# Patient Record
Sex: MALE | Race: BLACK OR AFRICAN AMERICAN | NOT HISPANIC OR LATINO | Employment: FULL TIME | URBAN - METROPOLITAN AREA
[De-identification: names, ages, dates, MRNs, and addresses within clinical notes are randomized per-mention and may not be internally consistent; named-entity substitution may affect disease eponyms.]

---

## 2018-03-05 ENCOUNTER — OFFICE VISIT (OUTPATIENT)
Dept: FAMILY MEDICINE | Facility: CLINIC | Age: 48
End: 2018-03-05
Payer: COMMERCIAL

## 2018-03-05 VITALS
BODY MASS INDEX: 37.75 KG/M2 | SYSTOLIC BLOOD PRESSURE: 122 MMHG | DIASTOLIC BLOOD PRESSURE: 92 MMHG | HEIGHT: 74 IN | RESPIRATION RATE: 16 BRPM | TEMPERATURE: 97 F | HEART RATE: 86 BPM | WEIGHT: 294.13 LBS | OXYGEN SATURATION: 96 %

## 2018-03-05 DIAGNOSIS — L91.8 SKIN TAG: Primary | ICD-10-CM

## 2018-03-05 PROCEDURE — 99999 PR PBB SHADOW E&M-NEW PATIENT-LVL III: CPT | Mod: PBBFAC,,, | Performed by: REGISTERED NURSE

## 2018-03-05 PROCEDURE — 99213 OFFICE O/P EST LOW 20 MIN: CPT | Mod: 25,S$GLB,, | Performed by: REGISTERED NURSE

## 2018-03-05 PROCEDURE — 3074F SYST BP LT 130 MM HG: CPT | Mod: S$GLB,,, | Performed by: REGISTERED NURSE

## 2018-03-05 PROCEDURE — 3080F DIAST BP >= 90 MM HG: CPT | Mod: S$GLB,,, | Performed by: REGISTERED NURSE

## 2018-03-05 PROCEDURE — 11200 RMVL SKIN TAGS UP TO&INC 15: CPT | Mod: S$GLB,,, | Performed by: REGISTERED NURSE

## 2018-03-05 NOTE — PROGRESS NOTES
"Subjective:       Patient ID: Michael Franz is a 47 y.o. male.    Chief Complaint: Skin Tags      HPI    Michael is a new patient to me today, here requesting to have skin tags removed.  This has been a chronic issue; has had removal done in the past.  Skin tag to RT axillary and side area.      Review of Systems  See HPI.      Patient Active Problem List   Diagnosis    Essential hypertension, benign    DJD (degenerative joint disease), lumbar    DJD (degenerative joint disease), cervical         Past Medical History:   Diagnosis Date    Essential hypertension, benign        Past medical, social and family histories have been reviewed today.      Objective:     Vitals:    03/05/18 0931   BP: (!) 122/92   BP Location: Left arm   Patient Position: Sitting   BP Method: Large (Manual)   Pulse: 86   Resp: 16   Temp: 97 °F (36.1 °C)   TempSrc: Tympanic   SpO2: 96%   Weight: 133.4 kg (294 lb 1.5 oz)   Height: 6' 2" (1.88 m)       Physical Exam   Constitutional: He is oriented to person, place, and time. He appears well-developed and well-nourished.   Neurological: He is alert and oriented to person, place, and time.   Skin:   Skin tag x 2 noted to RT upper side/flank area with 1 skin tag noted to RT upper axillary area --- no infection noted; fleshy colored, pedunculated.   Vitals reviewed.        Medication List with Changes/Refills   Discontinued Medications    BACLOFEN (LIORESAL) 10 MG TABLET    Take 1 tablet (10 mg total) by mouth 2 (two) times daily as needed.    HYDROCODONE-ACETAMINOPHEN  (LORTAB)  MG PER TABLET    Take 1 tablet by mouth every 6 (six) hours as needed.    HYDROCORTISONE 2.5 % CREAM    Apply topically 2 (two) times daily.         Verbal consent obtained for removal of skin tag x 3.  Area prepped in sterile fashion.  Anesthesized with Lidocaine 2 % epi w/out problems.  Skin tags clipped with sterile scissors, no bleeding noted to area.  Bandaids applied.  Pt tolerated entire procedure " well.      Diagnosis       1. Skin tag          Assessment/ Plan     Skin tag  · Wound care discussed.  · Follow-up in clinic as needed.        RACHELE Ferguson  [facility]

## 2018-06-19 ENCOUNTER — OFFICE VISIT (OUTPATIENT)
Dept: FAMILY MEDICINE | Facility: CLINIC | Age: 48
End: 2018-06-19
Payer: COMMERCIAL

## 2018-06-19 ENCOUNTER — LAB VISIT (OUTPATIENT)
Dept: LAB | Facility: HOSPITAL | Age: 48
End: 2018-06-19
Payer: COMMERCIAL

## 2018-06-19 VITALS
HEART RATE: 96 BPM | BODY MASS INDEX: 37.2 KG/M2 | DIASTOLIC BLOOD PRESSURE: 100 MMHG | OXYGEN SATURATION: 98 % | SYSTOLIC BLOOD PRESSURE: 140 MMHG | WEIGHT: 289.88 LBS | RESPIRATION RATE: 16 BRPM | TEMPERATURE: 98 F | HEIGHT: 74 IN

## 2018-06-19 DIAGNOSIS — Z12.5 SCREENING FOR PROSTATE CANCER: ICD-10-CM

## 2018-06-19 DIAGNOSIS — R03.0 ELEVATED BLOOD PRESSURE READING: ICD-10-CM

## 2018-06-19 DIAGNOSIS — R53.83 FATIGUE, UNSPECIFIED TYPE: ICD-10-CM

## 2018-06-19 DIAGNOSIS — Z00.01 ENCOUNTER FOR GENERAL ADULT MEDICAL EXAMINATION WITH ABNORMAL FINDINGS: Primary | ICD-10-CM

## 2018-06-19 DIAGNOSIS — Z00.01 ENCOUNTER FOR GENERAL ADULT MEDICAL EXAMINATION WITH ABNORMAL FINDINGS: ICD-10-CM

## 2018-06-19 LAB
25(OH)D3+25(OH)D2 SERPL-MCNC: 36 NG/ML
ALBUMIN SERPL BCP-MCNC: 4 G/DL
ALP SERPL-CCNC: 81 U/L
ALT SERPL W/O P-5'-P-CCNC: 29 U/L
ANION GAP SERPL CALC-SCNC: 9 MMOL/L
AST SERPL-CCNC: 22 U/L
BASOPHILS # BLD AUTO: 0.09 K/UL
BASOPHILS NFR BLD: 0.9 %
BILIRUB SERPL-MCNC: 0.3 MG/DL
BUN SERPL-MCNC: 13 MG/DL
CALCIUM SERPL-MCNC: 9.3 MG/DL
CHLORIDE SERPL-SCNC: 105 MMOL/L
CHOLEST SERPL-MCNC: 140 MG/DL
CHOLEST/HDLC SERPL: 4.2 {RATIO}
CO2 SERPL-SCNC: 26 MMOL/L
COMPLEXED PSA SERPL-MCNC: 0.53 NG/ML
CREAT SERPL-MCNC: 0.9 MG/DL
DIFFERENTIAL METHOD: ABNORMAL
EOSINOPHIL # BLD AUTO: 0.3 K/UL
EOSINOPHIL NFR BLD: 2.5 %
ERYTHROCYTE [DISTWIDTH] IN BLOOD BY AUTOMATED COUNT: 14 %
EST. GFR  (AFRICAN AMERICAN): >60 ML/MIN/1.73 M^2
EST. GFR  (NON AFRICAN AMERICAN): >60 ML/MIN/1.73 M^2
ESTIMATED AVG GLUCOSE: 114 MG/DL
GLUCOSE SERPL-MCNC: 76 MG/DL
HBA1C MFR BLD HPLC: 5.6 %
HCT VFR BLD AUTO: 46.9 %
HDLC SERPL-MCNC: 33 MG/DL
HDLC SERPL: 23.6 %
HGB BLD-MCNC: 14.5 G/DL
IMM GRANULOCYTES # BLD AUTO: 0.03 K/UL
IMM GRANULOCYTES NFR BLD AUTO: 0.3 %
LDLC SERPL CALC-MCNC: 89.6 MG/DL
LYMPHOCYTES # BLD AUTO: 3.4 K/UL
LYMPHOCYTES NFR BLD: 32.2 %
MAGNESIUM SERPL-MCNC: 2.2 MG/DL
MCH RBC QN AUTO: 27 PG
MCHC RBC AUTO-ENTMCNC: 30.9 G/DL
MCV RBC AUTO: 87 FL
MONOCYTES # BLD AUTO: 0.9 K/UL
MONOCYTES NFR BLD: 8.4 %
NEUTROPHILS # BLD AUTO: 5.8 K/UL
NEUTROPHILS NFR BLD: 55.7 %
NONHDLC SERPL-MCNC: 107 MG/DL
NRBC BLD-RTO: 0 /100 WBC
PLATELET # BLD AUTO: 319 K/UL
PMV BLD AUTO: 11.1 FL
POTASSIUM SERPL-SCNC: 4.5 MMOL/L
PROT SERPL-MCNC: 7.5 G/DL
RBC # BLD AUTO: 5.38 M/UL
SODIUM SERPL-SCNC: 140 MMOL/L
TRIGL SERPL-MCNC: 87 MG/DL
TSH SERPL DL<=0.005 MIU/L-ACNC: 0.51 UIU/ML
VIT B12 SERPL-MCNC: 414 PG/ML
WBC # BLD AUTO: 10.4 K/UL

## 2018-06-19 PROCEDURE — 3077F SYST BP >= 140 MM HG: CPT | Mod: CPTII,S$GLB,, | Performed by: FAMILY MEDICINE

## 2018-06-19 PROCEDURE — 36415 COLL VENOUS BLD VENIPUNCTURE: CPT | Mod: PO

## 2018-06-19 PROCEDURE — 82607 VITAMIN B-12: CPT

## 2018-06-19 PROCEDURE — 83036 HEMOGLOBIN GLYCOSYLATED A1C: CPT

## 2018-06-19 PROCEDURE — 85025 COMPLETE CBC W/AUTO DIFF WBC: CPT

## 2018-06-19 PROCEDURE — 84443 ASSAY THYROID STIM HORMONE: CPT

## 2018-06-19 PROCEDURE — 82306 VITAMIN D 25 HYDROXY: CPT

## 2018-06-19 PROCEDURE — 3080F DIAST BP >= 90 MM HG: CPT | Mod: CPTII,S$GLB,, | Performed by: FAMILY MEDICINE

## 2018-06-19 PROCEDURE — 99999 PR PBB SHADOW E&M-EST. PATIENT-LVL III: CPT | Mod: PBBFAC,,, | Performed by: FAMILY MEDICINE

## 2018-06-19 PROCEDURE — 80061 LIPID PANEL: CPT

## 2018-06-19 PROCEDURE — 99396 PREV VISIT EST AGE 40-64: CPT | Mod: S$GLB,,, | Performed by: FAMILY MEDICINE

## 2018-06-19 PROCEDURE — 80053 COMPREHEN METABOLIC PANEL: CPT

## 2018-06-19 PROCEDURE — 83735 ASSAY OF MAGNESIUM: CPT

## 2018-06-19 PROCEDURE — 84153 ASSAY OF PSA TOTAL: CPT

## 2018-06-19 NOTE — PROGRESS NOTES
"Subjective:      Patient ID: Michael Franz is a 47 y.o. male.    Chief Complaint: Annual Exam      Past Medical History:   Diagnosis Date    Essential hypertension, benign      Past Surgical History:   Procedure Laterality Date    LEG SURGERY       Family History   Problem Relation Age of Onset    Cancer Mother     Hypertension Father      Social History     Social History    Marital status:      Spouse name: N/A    Number of children: N/A    Years of education: N/A     Occupational History     [employer]     Social History Main Topics    Smoking status: Never Smoker    Smokeless tobacco: Never Used    Alcohol use Yes    Drug use: No    Sexual activity: Yes      Comment: wife     Other Topics Concern    Not on file     Social History Narrative    No narrative on file     No current outpatient prescriptions on file.  Review of patient's allergies indicates:  No Known Allergies    Review of Systems   Constitutional: Negative for chills and fever.   HENT: Negative for ear pain and sore throat.    Eyes: Negative for pain and redness.   Respiratory: Negative for wheezing and stridor.    Cardiovascular: Negative for chest pain and palpitations.   Gastrointestinal: Negative for abdominal pain and blood in stool.   Genitourinary: Negative for dysuria and hematuria.   Musculoskeletal: Negative for gait problem and joint swelling.   Neurological: Negative for seizures and speech difficulty.   Psychiatric/Behavioral: Negative for behavioral problems and hallucinations.     HPI  Here today for annual exam.  BP high today.  States usually normal.  Occasionally high.  Due for labs.  Denies any concerns except Occasionally fatigued a couple of times a week.    Objective:   BP (!) 140/100 (BP Location: Right arm, Patient Position: Sitting, BP Method: Small (Manual))   Pulse 96   Temp 97.9 °F (36.6 °C) (Tympanic)   Resp 16   Ht 6' 2" (1.88 m)   Wt 131.5 kg (289 lb 14.5 oz)   SpO2 98%   BMI 37.22 kg/m²    "   Physical Exam   Constitutional: He is oriented to person, place, and time. He is cooperative. No distress.   HENT:   Right Ear: Hearing, tympanic membrane, external ear and ear canal normal.   Left Ear: Hearing, tympanic membrane, external ear and ear canal normal.   Mouth/Throat: Uvula is midline, oropharynx is clear and moist and mucous membranes are normal.   Eyes: Conjunctivae and EOM are normal.   Cardiovascular: Normal rate, regular rhythm and normal heart sounds.    Pulmonary/Chest: Effort normal and breath sounds normal.   Abdominal: Soft. There is no tenderness.   Musculoskeletal: He exhibits no edema.   Neurological: He is alert and oriented to person, place, and time. Coordination and gait normal.   Skin: Skin is warm, dry and intact. No rash noted. He is not diaphoretic. No erythema.   Psychiatric: He has a normal mood and affect. His speech is normal and behavior is normal.   Nursing note and vitals reviewed.          Assessment:       1. Encounter for general adult medical examination with abnormal findings    2. Elevated blood pressure reading    3. Screening for prostate cancer    4. Fatigue, unspecified type            Plan:         Encounter for general adult medical examination with abnormal findings  Comments:  Importance of healthy diet and exercise d/w patient.  Labs ordered - call with results.  Orders:  -     Vitamin D; Future; Expected date: 06/19/2018  -     Vitamin B12; Future; Expected date: 06/19/2018  -     TSH; Future; Expected date: 06/19/2018  -     Magnesium; Future; Expected date: 06/19/2018  -     Lipid panel; Future; Expected date: 06/19/2018  -     Hemoglobin A1c; Future; Expected date: 06/19/2018  -     Comprehensive metabolic panel; Future; Expected date: 06/19/2018  -     CBC auto differential; Future; Expected date: 06/19/2018    Elevated blood pressure reading  Comments:  Focus on 10 ound weight loss.  Recheck in 3 months  or sooner if any problems.    Screening for  prostate cancer  -     PSA, Screening; Future; Expected date: 06/19/2018    Fatigue, unspecified type  Comments:  Check labs and call with results.        Patient Care Team:  Marivel Peguero MD as PCP - General (Family Medicine)    Follow-up in about 3 months (around 9/19/2018) for recheck bp.

## 2018-06-19 NOTE — PATIENT INSTRUCTIONS
Mediterranean Food Guide   People who live in the area around the [place] have a lower risk of heart disease. Researchers have recently shown that following a Mediterranean diet decreases heart disease by 30% in people whom are at-risk.   This lifestyle is built upon daily exercise along with a lot of fruit, vegetables, plant-based proteins, whole grains, fish and smaller amounts of poultry and red meat. Fatty fish (salmon), olive oil, and nuts make this diet higher in fat than the classic heart healthy diet. These fats are mostly unsaturated, and when consumed in place of saturated fat, are good for the heart. The pyramid above and the chart on the next page describe the types of food and serving sizes in this heart healthy meal plan.   Physical Activity- The Mediterranean Diet pyramid is built upon daily physical activity and exercise. Aim for at least 150 minutes of moderate to vigorous exercise every week. Moderate-to-vigorous exercises include walking at a brisk pace, biking, or swimming, among other activities that elevate your heart rate. Always choose activities that you enjoy and that are safe, in order to be active throughout your life.   Achieve and Maintain a Healthy Weight - The high fat content of the Mediterranean is healthy for your heart, but may lead to increased energy intake and weight gain if you dont pay attention to how much you are eating. If you are trying to lose weight, choose the smaller number of servings from each food group, and try to make your serving sizes match those listed. 2   Food Groups and Servings per day  Serving Sizes    Non-starchy Vegetables   4-8 servings per day  One serving is ½ cup of cooked vegetables or 1 cup raw vegetables   Non-starchy vegetables include artichoke, asparagus, beets, broccoli, [place] sprouts, cauliflower, cabbage, celery, carrots, tomatoes, eggplant, cucumber, onion, green and wax beans, zucchini, turnips, peppers, salad greens  and mushrooms. (Potatoes, peas, and corn are starchy vegetables.)    Fruit   2-4 servings per day  One serving is a small fresh fruit or ½ cup juice or ¼ cup dried fruit   Fresh fruits are preferred because of the fiber and other nutrients they contain. Fruits canned in light syrup or their own juice, and frozen fruit with little or no added sugar are also good choices. Use only small amounts of fruit juice (6 oz per day or less), since even unsweetened juices can contain as much sugar as regular soda.    Legumes and Nuts   1-3 servings per day  Legumes: One serving is ½ cup cooked kidney, black, garbanzo, marion, soy (edamame), navy beans, split peas, or lentils, or ¼ cup fat free refried beans or baked beans   Nuts and Seeds: One serving is 2 Tbsp. sunflower or sesame seeds, 1 Tbsp. peanut butter,   7-8 walnuts or pecans, 20 peanuts, or 12-15 almonds   Aim for 1-2 servings of nuts or seeds and 1-2 servings of legumes per day. Legumes are high in fiber, protein, and minerals. Nuts are high in unsaturated fat, and may increase HDL without increasing LDL cholesterol levels.    Low-fat Dairy Products   1-3 servings per day  One serving is 1 cup of skim milk, non-fat yogurt, or 1oz of low-fat (part-skim) cheese   Calcium-fortified soy milk, soy yogurt, and soy cheese can take the place of dairy products. If servings of dairy or fortified soy are less than 2 per day, we advise a calcium and vitamin D supplement.    Fish or shellfish   2-3 times a week  One serving is 3 ounces (about the size of a deck of cards)   Cook fish by baking, sautéing, broiling, roasting, grilling, or poaching. Choose fatty fishes like salmon, herring, sardines, or mackerel often. The fat in fish is high in omega-3 fats, so it has healthy effects on triglycerides and blood cells.    Poultry, if desired   1-3 times a week  One serving is 3 ounces (about the size of a deck of cards)   Bake, sauté, stir han, roast, or grill the poultry you eat, and  eat it without the skin.    Whole Grains and Starchy Vegetables   4-6 servings per day  One serving is about 1 ounce of any of these:   1 slice whole wheat bread ½ cup potatoes, sweet potatoes, corn, or peas   ½ large whole grain bun 1 small whole grain roll   6-inch whole wheat vane 6 whole grain crackers   ½ cup cooked whole grain cereal (oatmeal, cracked wheat, quinoa)   ½ cup cooked whole wheat pasta, brown rice, or barley   Whole grains are high in fiber and have less effect on blood sugar and triglyceride levels than refined, processed grains like white bread and pasta. Whole grains also keep the stomach full longer, making it easier to control hunger.

## 2018-11-09 ENCOUNTER — PATIENT OUTREACH (OUTPATIENT)
Dept: ADMINISTRATIVE | Facility: HOSPITAL | Age: 48
End: 2018-11-09

## 2021-04-28 ENCOUNTER — PATIENT MESSAGE (OUTPATIENT)
Dept: RESEARCH | Facility: HOSPITAL | Age: 51
End: 2021-04-28